# Patient Record
Sex: MALE | Race: OTHER | Employment: UNEMPLOYED | URBAN - METROPOLITAN AREA
[De-identification: names, ages, dates, MRNs, and addresses within clinical notes are randomized per-mention and may not be internally consistent; named-entity substitution may affect disease eponyms.]

---

## 2024-08-14 ENCOUNTER — APPOINTMENT (EMERGENCY)
Dept: RADIOLOGY | Facility: HOSPITAL | Age: 44
End: 2024-08-14

## 2024-08-14 ENCOUNTER — HOSPITAL ENCOUNTER (EMERGENCY)
Facility: HOSPITAL | Age: 44
Discharge: HOME | End: 2024-08-14
Attending: EMERGENCY MEDICINE

## 2024-08-14 VITALS
OXYGEN SATURATION: 99 % | SYSTOLIC BLOOD PRESSURE: 114 MMHG | HEART RATE: 72 BPM | RESPIRATION RATE: 18 BRPM | TEMPERATURE: 98.5 F | DIASTOLIC BLOOD PRESSURE: 71 MMHG | WEIGHT: 149.91 LBS

## 2024-08-14 DIAGNOSIS — M79.89 HAND SWELLING: Primary | ICD-10-CM

## 2024-08-14 LAB
ALBUMIN SERPL BCG-MCNC: 4.1 G/DL (ref 3.5–5)
ALP SERPL-CCNC: 73 U/L (ref 34–104)
ALT SERPL W P-5'-P-CCNC: 18 U/L (ref 7–52)
ANION GAP SERPL CALCULATED.3IONS-SCNC: 3 MMOL/L (ref 4–13)
AST SERPL W P-5'-P-CCNC: 17 U/L (ref 13–39)
ATRIAL RATE: 54 BPM
BASOPHILS # BLD AUTO: 0.04 THOUSANDS/ÂΜL (ref 0–0.1)
BASOPHILS NFR BLD AUTO: 1 % (ref 0–1)
BILIRUB DIRECT SERPL-MCNC: 0.19 MG/DL (ref 0–0.2)
BILIRUB SERPL-MCNC: 1 MG/DL (ref 0.2–1)
BUN SERPL-MCNC: 18 MG/DL (ref 5–25)
CALCIUM SERPL-MCNC: 9.1 MG/DL (ref 8.4–10.2)
CHLORIDE SERPL-SCNC: 107 MMOL/L (ref 96–108)
CO2 SERPL-SCNC: 28 MMOL/L (ref 21–32)
CREAT SERPL-MCNC: 0.75 MG/DL (ref 0.6–1.3)
CRP SERPL QL: 35 MG/L
EOSINOPHIL # BLD AUTO: 0.24 THOUSAND/ÂΜL (ref 0–0.61)
EOSINOPHIL NFR BLD AUTO: 5 % (ref 0–6)
ERYTHROCYTE [DISTWIDTH] IN BLOOD BY AUTOMATED COUNT: 12.2 % (ref 11.6–15.1)
ERYTHROCYTE [SEDIMENTATION RATE] IN BLOOD: 5 MM/HOUR (ref 0–14)
GFR SERPL CREATININE-BSD FRML MDRD: 110 ML/MIN/1.73SQ M
GLUCOSE SERPL-MCNC: 86 MG/DL (ref 65–140)
HCT VFR BLD AUTO: 39.1 % (ref 36.5–49.3)
HGB BLD-MCNC: 13.3 G/DL (ref 12–17)
IMM GRANULOCYTES # BLD AUTO: 0.02 THOUSAND/UL (ref 0–0.2)
IMM GRANULOCYTES NFR BLD AUTO: 0 % (ref 0–2)
LYMPHOCYTES # BLD AUTO: 1.46 THOUSANDS/ÂΜL (ref 0.6–4.47)
LYMPHOCYTES NFR BLD AUTO: 29 % (ref 14–44)
MCH RBC QN AUTO: 30.2 PG (ref 26.8–34.3)
MCHC RBC AUTO-ENTMCNC: 34 G/DL (ref 31.4–37.4)
MCV RBC AUTO: 89 FL (ref 82–98)
MONOCYTES # BLD AUTO: 0.53 THOUSAND/ÂΜL (ref 0.17–1.22)
MONOCYTES NFR BLD AUTO: 11 % (ref 4–12)
NEUTROPHILS # BLD AUTO: 2.77 THOUSANDS/ÂΜL (ref 1.85–7.62)
NEUTS SEG NFR BLD AUTO: 54 % (ref 43–75)
NRBC BLD AUTO-RTO: 0 /100 WBCS
P AXIS: 85 DEGREES
PLATELET # BLD AUTO: 165 THOUSANDS/UL (ref 149–390)
PMV BLD AUTO: 11.4 FL (ref 8.9–12.7)
POTASSIUM SERPL-SCNC: 4 MMOL/L (ref 3.5–5.3)
PR INTERVAL: 146 MS
PROT SERPL-MCNC: 7 G/DL (ref 6.4–8.4)
QRS AXIS: 67 DEGREES
QRSD INTERVAL: 84 MS
QT INTERVAL: 408 MS
QTC INTERVAL: 386 MS
RBC # BLD AUTO: 4.41 MILLION/UL (ref 3.88–5.62)
SODIUM SERPL-SCNC: 138 MMOL/L (ref 135–147)
T WAVE AXIS: 68 DEGREES
VENTRICULAR RATE: 54 BPM
WBC # BLD AUTO: 5.06 THOUSAND/UL (ref 4.31–10.16)

## 2024-08-14 PROCEDURE — 86140 C-REACTIVE PROTEIN: CPT | Performed by: EMERGENCY MEDICINE

## 2024-08-14 PROCEDURE — 80048 BASIC METABOLIC PNL TOTAL CA: CPT | Performed by: EMERGENCY MEDICINE

## 2024-08-14 PROCEDURE — 93010 ELECTROCARDIOGRAM REPORT: CPT

## 2024-08-14 PROCEDURE — 73130 X-RAY EXAM OF HAND: CPT

## 2024-08-14 PROCEDURE — 85652 RBC SED RATE AUTOMATED: CPT | Performed by: EMERGENCY MEDICINE

## 2024-08-14 PROCEDURE — 99284 EMERGENCY DEPT VISIT MOD MDM: CPT

## 2024-08-14 PROCEDURE — 80076 HEPATIC FUNCTION PANEL: CPT | Performed by: EMERGENCY MEDICINE

## 2024-08-14 PROCEDURE — 73630 X-RAY EXAM OF FOOT: CPT

## 2024-08-14 PROCEDURE — 93005 ELECTROCARDIOGRAM TRACING: CPT

## 2024-08-14 PROCEDURE — 85025 COMPLETE CBC W/AUTO DIFF WBC: CPT | Performed by: EMERGENCY MEDICINE

## 2024-08-14 PROCEDURE — 99284 EMERGENCY DEPT VISIT MOD MDM: CPT | Performed by: EMERGENCY MEDICINE

## 2024-08-14 PROCEDURE — 36415 COLL VENOUS BLD VENIPUNCTURE: CPT | Performed by: EMERGENCY MEDICINE

## 2024-08-14 RX ORDER — PREDNISONE 20 MG/1
20 TABLET ORAL DAILY
Qty: 15 TABLET | Refills: 0 | Status: SHIPPED | OUTPATIENT
Start: 2024-08-14

## 2024-08-14 RX ORDER — NAPROXEN 500 MG/1
500 TABLET ORAL 2 TIMES DAILY WITH MEALS
Qty: 20 TABLET | Refills: 0 | Status: SHIPPED | OUTPATIENT
Start: 2024-08-14 | End: 2024-08-24

## 2024-08-14 RX ORDER — PREDNISONE 20 MG/1
60 TABLET ORAL ONCE
Status: COMPLETED | OUTPATIENT
Start: 2024-08-14 | End: 2024-08-14

## 2024-08-14 RX ADMIN — PREDNISONE 60 MG: 20 TABLET ORAL at 14:08

## 2024-08-14 NOTE — ED PROVIDER NOTES
History  Chief Complaint   Patient presents with    Hand Swelling     Pt c/o right hand and left foot swelling that started 5 days ago. Pt denies injury but reports that is has happened in the past when he lived in [place] and was stress related      43 YO male presents with swelling and pain in the Right hand and Left foot. States this began a few days prior, has been constant, primarily over the dorsal aspect of each. Patient denies injury, he states he has had similar in the past, notes this had been due to stress. Patient has not taken anything for this. Pt denies CP/SOB/F/C/N/V/D/C, no dysuria, burning on urination or blood in urine.       History provided by:  Patient   used: Yes        None       History reviewed. No pertinent past medical history.    History reviewed. No pertinent surgical history.    History reviewed. No pertinent family history.  I have reviewed and agree with the history as documented.    E-Cigarette/Vaping     E-Cigarette/Vaping Substances     Social History     Tobacco Use    Smoking status: Never    Smokeless tobacco: Never   Substance Use Topics    Drug use: Never       Review of Systems   Constitutional:  Negative for fever.   HENT:  Negative for dental problem.    Eyes:  Negative for visual disturbance.   Respiratory:  Negative for shortness of breath.    Cardiovascular:  Negative for chest pain.   Gastrointestinal:  Negative for abdominal pain, nausea and vomiting.   Genitourinary:  Negative for dysuria and frequency.   Musculoskeletal:  Positive for arthralgias. Negative for neck pain and neck stiffness.   Skin:  Negative for rash.   Neurological:  Negative for dizziness, weakness and light-headedness.   Psychiatric/Behavioral:  Negative for agitation, behavioral problems and confusion.    All other systems reviewed and are negative.      Physical Exam  Physical Exam  Vitals and nursing note reviewed.   Constitutional:       Appearance: He is well-developed.    HENT:      Head: Normocephalic and atraumatic.   Eyes:      Extraocular Movements: Extraocular movements intact.   Cardiovascular:      Rate and Rhythm: Normal rate.   Pulmonary:      Effort: Pulmonary effort is normal.   Abdominal:      General: There is no distension.   Musculoskeletal:         General: Normal range of motion.      Cervical back: Normal range of motion.      Comments: Mild swelling to the dorsal Right hand with tenderness to palpation, no erythema or induration, no fluctuance, swelling does not include the wrist. No obvious swelling to the Left dorsal foot, tenderness to palpation.   Skin:     Findings: No rash.   Neurological:      Mental Status: He is alert and oriented to person, place, and time.   Psychiatric:         Behavior: Behavior normal.         Vital Signs  ED Triage Vitals [08/14/24 1200]   Temperature Pulse Respirations Blood Pressure SpO2   98.5 °F (36.9 °C) 72 18 114/71 99 %      Temp src Heart Rate Source Patient Position - Orthostatic VS BP Location FiO2 (%)   -- -- Sitting Left arm --      Pain Score       --           Vitals:    08/14/24 1200   BP: 114/71   Pulse: 72   Patient Position - Orthostatic VS: Sitting         Visual Acuity      ED Medications  Medications   predniSONE tablet 60 mg (60 mg Oral Given 8/14/24 1408)       Diagnostic Studies  Results Reviewed       Procedure Component Value Units Date/Time    Basic metabolic panel [[id number]]  (Abnormal) Collected: 08/14/24 1242    Lab Status: Final result Specimen: Blood from Arm, Left Updated: 08/14/24 1301     Sodium 138 mmol/L      Potassium 4.0 mmol/L      Chloride 107 mmol/L      CO2 28 mmol/L      ANION GAP 3 mmol/L      BUN 18 mg/dL      Creatinine 0.75 mg/dL      Glucose 86 mg/dL      Calcium 9.1 mg/dL      eGFR 110 ml/min/1.73sq m     Narrative:      National Kidney Disease Foundation guidelines for Chronic Kidney Disease (CKD):     Stage 1 with normal or high GFR (GFR > 90 mL/min/1.73 square meters)    Stage 2  Mild CKD (GFR = 60-89 mL/min/1.73 square meters)    Stage 3A Moderate CKD (GFR = 45-59 mL/min/1.73 square meters)    Stage 3B Moderate CKD (GFR = 30-44 mL/min/1.73 square meters)    Stage 4 Severe CKD (GFR = 15-29 mL/min/1.73 square meters)    Stage 5 End Stage CKD (GFR <15 mL/min/1.73 square meters)  Note: GFR calculation is accurate only with a steady state creatinine    Hepatic function panel [[id number]]  (Normal) Collected: 08/14/24 1242    Lab Status: Final result Specimen: Blood from Arm, Left Updated: 08/14/24 1301     Total Bilirubin 1.00 mg/dL      Bilirubin, Direct 0.19 mg/dL      Alkaline Phosphatase 73 U/L      AST 17 U/L      ALT 18 U/L      Total Protein 7.0 g/dL      Albumin 4.1 g/dL     C-reactive protein [[id number]]  (Abnormal) Collected: 08/14/24 1242    Lab Status: Final result Specimen: Blood from Arm, Left Updated: 08/14/24 1301     CRP 35.0 mg/L     Sedimentation rate, automated [[id number]]  (Normal) Collected: 08/14/24 1242    Lab Status: Final result Specimen: Blood from Arm, Left Updated: 08/14/24 1300     Sed Rate 5 mm/hour     CBC and differential [[id number]] Collected: 08/14/24 1242    Lab Status: Final result Specimen: Blood from Arm, Left Updated: 08/14/24 1247     WBC 5.06 Thousand/uL      RBC 4.41 Million/uL      Hemoglobin 13.3 g/dL      Hematocrit 39.1 %      MCV 89 fL      MCH 30.2 pg      MCHC 34.0 g/dL      RDW 12.2 %      MPV 11.4 fL      Platelets 165 Thousands/uL      nRBC 0 /100 WBCs      Segmented % 54 %      Immature Grans % 0 %      Lymphocytes % 29 %      Monocytes % 11 %      Eosinophils Relative 5 %      Basophils Relative 1 %      Absolute Neutrophils 2.77 Thousands/µL      Absolute Immature Grans 0.02 Thousand/uL      Absolute Lymphocytes 1.46 Thousands/µL      Absolute Monocytes 0.53 Thousand/µL      Eosinophils Absolute 0.24 Thousand/µL      Basophils Absolute 0.04 Thousands/µL                    XR foot 3+ views LEFT   Final Result by Mohan Albarran MD  (08/14 1402)      No acute osseous abnormality.   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         Workstation performed: [id number]         XR hand 3+ views RIGHT   ED Interpretation by Paulie Sanchez MD (08/14 1333)   No fracture, multiple joints in digit with decreased joint space, appears arthritic.      Final Result by Mohan Albarran MD (08/14 1402)      No acute osseous abnormality.   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         Workstation performed: [id number]                    Procedures  Procedures         ED Course  ED Course as of 08/14/24 2120   Wed Aug 14, 2024   1332 Discussed further with patient, states, when he did have similar swelling in [facility] he was treated with dexamethasone which did help.                                 SBIRT 20yo+      Flowsheet Row Most Recent Value   Initial Alcohol Screen: US AUDIT-C     1. How often do you have a drink containing alcohol? 1 Filed at: 08/14/2024 1202   2. How many drinks containing alcohol do you have on a typical day you are drinking?  1 Filed at: 08/14/2024 1202   3a. Male UNDER 65: How often do you have five or more drinks on one occasion? 0 Filed at: 08/14/2024 1202   3b. FEMALE Any Age, or MALE 65+: How often do you have 4 or more drinks on one occassion? 0 Filed at: 08/14/2024 1202   Audit-C Score 2 Filed at: 08/14/2024 1202   LIN: How many times in the past year have you...    Used an illegal drug or used a prescription medication for non-medical reasons? Never Filed at: 08/14/2024 1202                      Medical Decision Making  1. Hand and foot swelling - Patient with spontaneous swelling to the hand and the foot, states he has had this in the past; notes it was due to stress. Will check inflammatory markers including ESR and CRP, CBC, metabolic panel  for electrolyte abnormalities and dehydration, will x-ray the Right hand the Left foot. Likely start on anti-inflammatories.    Problems Addressed:  Hand swelling: acute illness or injury    Amount and/or Complexity of Data Reviewed  Labs: ordered.  Radiology: ordered and independent interpretation performed.    Risk  Prescription drug management.                 Disposition  Final diagnoses:   Hand swelling     Time reflects when diagnosis was documented in both MDM as applicable and the Disposition within this note       Time User Action Codes Description Comment    8/14/2024  1:33 PM Paulie Sanchez Add [M79.89] Hand swelling           ED Disposition       ED Disposition   Discharge    Condition   Stable    Date/Time   Wed Aug 14, 2024 1333    Comment   Elliot Terry discharge to home/self care.                   Follow-up Information       Follow up With Specialties Details Why Contact Info Additional Information    [facility]   [address] [place] [id number] [phone] [facility], [address] [place] [address], [id number], [phone]    [facility]   [address] [address]  [phone]               Discharge Medication List as of 8/14/2024  1:36 PM        START taking these medications    Details   naproxen (NAPROSYN) 500 mg tablet Take 1 tablet (500 mg total) by mouth 2 (two) times a day with meals for 10 days, Starting Wed 8/14/2024, Until Sat 8/24/2024, Print      predniSONE 20 mg tablet Take 1 tablet (20 mg total) by mouth daily, Starting Wed 8/14/2024, Print             No discharge procedures on file.    PDMP Review       None            ED Provider  Electronically Signed by             Paulie Sanchez MD  08/14/24 9895

## 2024-08-14 NOTE — DISCHARGE INSTRUCTIONS
Take the Naprosyn twice daily for the next 5-10 days.    Take the Prednisone, 3 pills daily, for the next 5 days. Start taking this tomorrow.    The number for rheumatology is included in these discharge instructions, call to be seen in the office for further evaluation and management.    The number for the [facility] has been included in these discharge instructions, you should call to establish continuing medical care.     Pole Ojea Naprosyn dos veces al día svetlana los próximos 5 a 10 días.     Pole Ojea prednisona, 3 pastillas al día, svetlana los próximos 5 días. Comience a tomarla mañana.     El número de reumatología está incluido en estas instrucciones de fani; llame para que lo atiendan en el consultorio para juan evaluación y un tratamiento adicionales.     El número del [facility] se hernandez incluido en estas instrucciones de fani; debe llamar para establecer atención médica continua.
